# Patient Record
Sex: MALE | Race: WHITE
[De-identification: names, ages, dates, MRNs, and addresses within clinical notes are randomized per-mention and may not be internally consistent; named-entity substitution may affect disease eponyms.]

---

## 2017-06-11 ENCOUNTER — HOSPITAL ENCOUNTER (INPATIENT)
Dept: HOSPITAL 47 - EC | Age: 72
LOS: 1 days | Discharge: HOME | DRG: 607 | End: 2017-06-12
Payer: MEDICARE

## 2017-06-11 VITALS — RESPIRATION RATE: 18 BRPM

## 2017-06-11 VITALS — BODY MASS INDEX: 27.2 KG/M2

## 2017-06-11 DIAGNOSIS — I48.91: ICD-10-CM

## 2017-06-11 DIAGNOSIS — I25.2: ICD-10-CM

## 2017-06-11 DIAGNOSIS — I49.3: ICD-10-CM

## 2017-06-11 DIAGNOSIS — Z96.1: ICD-10-CM

## 2017-06-11 DIAGNOSIS — Z79.82: ICD-10-CM

## 2017-06-11 DIAGNOSIS — I25.119: ICD-10-CM

## 2017-06-11 DIAGNOSIS — Z98.42: ICD-10-CM

## 2017-06-11 DIAGNOSIS — K21.9: ICD-10-CM

## 2017-06-11 DIAGNOSIS — Z98.41: ICD-10-CM

## 2017-06-11 DIAGNOSIS — I11.0: ICD-10-CM

## 2017-06-11 DIAGNOSIS — K74.60: ICD-10-CM

## 2017-06-11 DIAGNOSIS — T78.40XA: ICD-10-CM

## 2017-06-11 DIAGNOSIS — Z79.899: ICD-10-CM

## 2017-06-11 DIAGNOSIS — M19.91: ICD-10-CM

## 2017-06-11 DIAGNOSIS — L53.9: Primary | ICD-10-CM

## 2017-06-11 DIAGNOSIS — K44.9: ICD-10-CM

## 2017-06-11 DIAGNOSIS — R21: ICD-10-CM

## 2017-06-11 DIAGNOSIS — Z87.11: ICD-10-CM

## 2017-06-11 DIAGNOSIS — I50.22: ICD-10-CM

## 2017-06-11 DIAGNOSIS — E11.9: ICD-10-CM

## 2017-06-11 DIAGNOSIS — Z87.891: ICD-10-CM

## 2017-06-11 DIAGNOSIS — I25.5: ICD-10-CM

## 2017-06-11 DIAGNOSIS — Z86.718: ICD-10-CM

## 2017-06-11 DIAGNOSIS — N40.0: ICD-10-CM

## 2017-06-11 DIAGNOSIS — E78.5: ICD-10-CM

## 2017-06-11 LAB
ALP SERPL-CCNC: 411 U/L (ref 38–126)
ALT SERPL-CCNC: 76 U/L (ref 21–72)
AMYLASE SERPL-CCNC: 61 U/L (ref 30–110)
ANION GAP SERPL CALC-SCNC: 5 MMOL/L
ANION GAP SERPL CALC-SCNC: 7 MMOL/L
APTT BLD: 24.7 SEC (ref 22–30)
AST SERPL-CCNC: 73 U/L (ref 17–59)
BUN SERPL-SCNC: 32 MG/DL (ref 9–20)
BUN SERPL-SCNC: 35 MG/DL (ref 9–20)
CALCIUM SPEC-MCNC: 8.8 MG/DL (ref 8.4–10.2)
CALCIUM SPEC-MCNC: 8.8 MG/DL (ref 8.4–10.2)
CELLS COUNTED: 200
CH: 35.4
CHCM: 33.5
CHLORIDE SERPL-SCNC: 111 MMOL/L (ref 98–107)
CHLORIDE SERPL-SCNC: 111 MMOL/L (ref 98–107)
CK SERPL-CCNC: 107 U/L (ref 55–170)
CK SERPL-CCNC: 132 U/L (ref 55–170)
CK SERPL-CCNC: 192 U/L (ref 55–170)
CO2 SERPL-SCNC: 21 MMOL/L (ref 22–30)
CO2 SERPL-SCNC: 24 MMOL/L (ref 22–30)
ERYTHROCYTE [DISTWIDTH] IN BLOOD BY AUTOMATED COUNT: 4.01 M/UL (ref 4.3–5.9)
ERYTHROCYTE [DISTWIDTH] IN BLOOD: 14.9 % (ref 11.5–15.5)
GLUCOSE SERPL-MCNC: 163 MG/DL (ref 74–99)
GLUCOSE SERPL-MCNC: 190 MG/DL (ref 74–99)
HCT VFR BLD AUTO: 42.5 % (ref 39–53)
HDW: 2.28
HGB BLD-MCNC: 14.8 GM/DL (ref 13–17.5)
INR PPP: 1.2 (ref ?–1.1)
MAGNESIUM SPEC-SCNC: 2.2 MG/DL (ref 1.6–2.3)
MCH RBC QN AUTO: 36.9 PG (ref 25–35)
MCHC RBC AUTO-ENTMCNC: 34.9 G/DL (ref 31–37)
MCV RBC AUTO: 106 FL (ref 80–100)
NON-AFRICAN AMERICAN GFR(MDRD): >60
NON-AFRICAN AMERICAN GFR(MDRD): >60
POTASSIUM SERPL-SCNC: 5.3 MMOL/L (ref 3.5–5.1)
POTASSIUM SERPL-SCNC: 5.5 MMOL/L (ref 3.5–5.1)
POTASSIUM SERPL-SCNC: 6 MMOL/L (ref 3.5–5.1)
PROT SERPL-MCNC: 7.2 G/DL (ref 6.3–8.2)
PT BLD: 11.8 SEC (ref 9–12)
SODIUM SERPL-SCNC: 139 MMOL/L (ref 137–145)
SODIUM SERPL-SCNC: 140 MMOL/L (ref 137–145)
TROPONIN I SERPL-MCNC: 0.02 NG/ML (ref 0–0.03)
TROPONIN I SERPL-MCNC: <0.012 NG/ML (ref 0–0.03)
TROPONIN I SERPL-MCNC: <0.012 NG/ML (ref 0–0.03)
WBC # BLD AUTO: 10.7 K/UL (ref 3.8–10.6)
WBC (PEROX): 11.03

## 2017-06-11 PROCEDURE — 96375 TX/PRO/DX INJ NEW DRUG ADDON: CPT

## 2017-06-11 PROCEDURE — 80053 COMPREHEN METABOLIC PANEL: CPT

## 2017-06-11 PROCEDURE — 80048 BASIC METABOLIC PNL TOTAL CA: CPT

## 2017-06-11 PROCEDURE — 82977 ASSAY OF GGT: CPT

## 2017-06-11 PROCEDURE — 82550 ASSAY OF CK (CPK): CPT

## 2017-06-11 PROCEDURE — 82553 CREATINE MB FRACTION: CPT

## 2017-06-11 PROCEDURE — 96374 THER/PROPH/DIAG INJ IV PUSH: CPT

## 2017-06-11 PROCEDURE — 83735 ASSAY OF MAGNESIUM: CPT

## 2017-06-11 PROCEDURE — 94760 N-INVAS EAR/PLS OXIMETRY 1: CPT

## 2017-06-11 PROCEDURE — 83036 HEMOGLOBIN GLYCOSYLATED A1C: CPT

## 2017-06-11 PROCEDURE — 83880 ASSAY OF NATRIURETIC PEPTIDE: CPT

## 2017-06-11 PROCEDURE — 85730 THROMBOPLASTIN TIME PARTIAL: CPT

## 2017-06-11 PROCEDURE — 87389 HIV-1 AG W/HIV-1&-2 AB AG IA: CPT

## 2017-06-11 PROCEDURE — 85610 PROTHROMBIN TIME: CPT

## 2017-06-11 PROCEDURE — 85025 COMPLETE CBC W/AUTO DIFF WBC: CPT

## 2017-06-11 PROCEDURE — 84132 ASSAY OF SERUM POTASSIUM: CPT

## 2017-06-11 PROCEDURE — 93005 ELECTROCARDIOGRAM TRACING: CPT

## 2017-06-11 PROCEDURE — 76705 ECHO EXAM OF ABDOMEN: CPT

## 2017-06-11 PROCEDURE — 83690 ASSAY OF LIPASE: CPT

## 2017-06-11 PROCEDURE — 82150 ASSAY OF AMYLASE: CPT

## 2017-06-11 PROCEDURE — 99285 EMERGENCY DEPT VISIT HI MDM: CPT

## 2017-06-11 PROCEDURE — 80074 ACUTE HEPATITIS PANEL: CPT

## 2017-06-11 PROCEDURE — 80061 LIPID PANEL: CPT

## 2017-06-11 PROCEDURE — 84484 ASSAY OF TROPONIN QUANT: CPT

## 2017-06-11 PROCEDURE — 71020: CPT

## 2017-06-11 PROCEDURE — 36415 COLL VENOUS BLD VENIPUNCTURE: CPT

## 2017-06-11 RX ADMIN — DIPHENHYDRAMINE HYDROCHLORIDE PRN MG: 50 INJECTION, SOLUTION INTRAMUSCULAR; INTRAVENOUS at 20:03

## 2017-06-11 RX ADMIN — PANTOPRAZOLE SODIUM SCH MG: 40 TABLET, DELAYED RELEASE ORAL at 17:51

## 2017-06-11 RX ADMIN — DIPHENHYDRAMINE HYDROCHLORIDE PRN MG: 50 INJECTION, SOLUTION INTRAMUSCULAR; INTRAVENOUS at 12:00

## 2017-06-11 NOTE — US
EXAMINATION TYPE: US abdomen limited

 

DATE OF EXAM: 6/11/2017

 

COMPARISON: CT abdomen and pelvis October 11, 2016

 

CLINICAL HISTORY: Pain. elevated liver enzymes, cholecystectomy

 

EXAM MEASUREMENTS:

 

Liver Length:  11.8 cm   

Gallbladder Wall:  Surgically absent  

CBD:  0.5 cm

Right Kidney:  10.7 x 4.6 x 4.4 cm

 

 

 

Pancreas:  Obscured by bowel gas

Liver:  limited evaluation, best visualized intercostally, appears course  

Gallbladder:  Surgically absent

**Evidence for sonographic Ring's sign:  no

CBD: visualized segment appears wnl 

Right Kidney:  no evidence of hydronephrosis  

 

Exam noted suboptimal by technologist due to body habitus and shadowing from overlying bowel gas. Vis
ualized liver is heterogeneous in appearance, no worrisome intrahepatic ductal dilatation seen. Findi
ngs correlate with CT with coarse lobulated architecture. Evaluation for focal masses is suboptimal d
ue to the heterogeneity.

 

IMPRESSION: Heterogeneity of liver when correlated with CT favors cirrhosis over diffuse fatty infilt
ration. Clinical and lab correlation advised.
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
## 2017-06-11 NOTE — CONS
DATE OF CONSULTATION:  



This is a 72-year-old gentleman with a known history of CAD, previous 

aortocoronary bypass surgery and about 18 months ago I performed 

cardiac catheterization, which revealed that his bypasses were patent 

and he was advised medical therapy. He also has issues with some liver 

dysfunction and has had some time to time ascites. This patient is 

status post previous cervical spine surgery as well. Cardiac cath from 

2015 in October revealed that he had a total occlusion of a 

nonfunctional right internal mammary artery graft LAD but the LIMA to 

LAD was widely patent and vein graft to the distal RCA/PDA was widely 

patent. Circumflex and ramus were free of significant disease. Large 

septal branch had some stenosis and ejection fraction is 45% with 

inferobasal hypokinesia and no significant MR. This hospitalization 

appears to be more or less with complaints of some rash all over the 

body and some itching and also had some discomfort in the chest, sharp 

in nature, lasting a few seconds and then he had some pain on the 

right lateral aspect of the chest and right jaw. The pain is very 

atypical, fleeting in nature, sharp in nature. Troponins are normal. 

He is resting comfortably without symptoms.  



PAST MEDICAL HISTORY: 

1. Remarkable for coronary artery disease, previous bypass surgery 

with patent grafts.  

2. History of diabetes mellitus. 

3. Hyperlipidemia. 

4. History of liver disease with ascites in the past. 



MEDICATIONS: Flomax 0.4 mg daily, Lipitor 40 mg daily, aspirin 81 mg 

daily, iron supplements, metoprolol 75 mg daily, omeprazole 20 mg 

daily.  



ALLERGIES: CODEINE. 



On examination, blood pressure is 128/70, pulse rate 70 per minute, 

regular.  

HEENT: Unremarkable. Fundus was not examined by me. Neck is supple. 

There is no significant JVD or carotid bruit. S1, S2 heard normally. 

Short systolic murmur noted. Lungs reveal diminished air entry.  

Abdomen is soft, distended. There is mild hepatomegaly. Lower 

extremities reveal diminished pulses. Central nervous system grossly 

within normal limits.  



EKG revealed sinus mechanism with nonspecific ST-T changes of a mild 

degree.  



IMPRESSION: 

1. Atypical chest pain. 

2. Diffuse erythematous rash all over the body of unclear etiology. 

3. History of coronary artery disease, stable, previous bypass surgery. 

4. Type 2 diabetes mellitus. 

5. History of liver disease. 

6. History of benign prostatic hypertrophy. 



RECOMMENDATIONS: I am recommending that from a cardiac standpoint, no 

intervention is necessary. I recommend that we continue current 

medical therapy without any specific intervention. His rash issues, 

which seems to be his most major concern, will be addressed by the 

admitting doctor. We will see this patient as needed from a cardiac 

standpoint. Thank you very much for the consult.

## 2017-06-12 VITALS — SYSTOLIC BLOOD PRESSURE: 132 MMHG | HEART RATE: 58 BPM | TEMPERATURE: 97.5 F | DIASTOLIC BLOOD PRESSURE: 69 MMHG

## 2017-06-12 LAB
ALP SERPL-CCNC: 230 U/L (ref 38–126)
ALT SERPL-CCNC: 64 U/L (ref 21–72)
ANION GAP SERPL CALC-SCNC: 7 MMOL/L
AST SERPL-CCNC: 44 U/L (ref 17–59)
BASOPHILS # BLD AUTO: 0 K/UL (ref 0–0.2)
BASOPHILS NFR BLD AUTO: 0 %
BUN SERPL-SCNC: 30 MG/DL (ref 9–20)
CALCIUM SPEC-MCNC: 9.6 MG/DL (ref 8.4–10.2)
CH: 35.4
CHCM: 33.8
CHLORIDE SERPL-SCNC: 109 MMOL/L (ref 98–107)
CHOLEST SERPL-MCNC: 361 MG/DL (ref ?–200)
CO2 SERPL-SCNC: 24 MMOL/L (ref 22–30)
EOSINOPHIL # BLD AUTO: 0 K/UL (ref 0–0.7)
EOSINOPHIL NFR BLD AUTO: 0 %
ERYTHROCYTE [DISTWIDTH] IN BLOOD BY AUTOMATED COUNT: 4.01 M/UL (ref 4.3–5.9)
ERYTHROCYTE [DISTWIDTH] IN BLOOD: 14.6 % (ref 11.5–15.5)
GGT SERPL-CCNC: 312 U/L (ref 15–73)
GLUCOSE SERPL-MCNC: 120 MG/DL (ref 74–99)
HCT VFR BLD AUTO: 42.3 % (ref 39–53)
HDLC SERPL-MCNC: 49 MG/DL (ref 40–60)
HDW: 2.2
HEMOGLOBIN A1C: 5.3 % (ref 4.2–6.1)
HEPATITIS B CORE IGM INDEX: 0.03
HEPATITIS B SURFACE AG INDEX: 0.05
HEPATITIS C VIRUS IGG  INDEX: 0.04
HGB BLD-MCNC: 14.2 GM/DL (ref 13–17.5)
LUC NFR BLD AUTO: 1 %
LYMPHOCYTES # SPEC AUTO: 0.6 K/UL (ref 1–4.8)
LYMPHOCYTES NFR SPEC AUTO: 3 %
MCH RBC QN AUTO: 35.3 PG (ref 25–35)
MCHC RBC AUTO-ENTMCNC: 33.5 G/DL (ref 31–37)
MCV RBC AUTO: 105.3 FL (ref 80–100)
MONOCYTES # BLD AUTO: 0.9 K/UL (ref 0–1)
MONOCYTES NFR BLD AUTO: 5 %
NEUTROPHILS # BLD AUTO: 15.7 K/UL (ref 1.3–7.7)
NEUTROPHILS NFR BLD AUTO: 90 %
NON-AFRICAN AMERICAN GFR(MDRD): >60
POTASSIUM SERPL-SCNC: 4.9 MMOL/L (ref 3.5–5.1)
PROT SERPL-MCNC: 6.8 G/DL (ref 6.3–8.2)
SODIUM SERPL-SCNC: 140 MMOL/L (ref 137–145)
TRIGL SERPL-MCNC: 94 MG/DL (ref ?–150)
WBC # BLD AUTO: 0.21 10*3/UL
WBC # BLD AUTO: 17.4 K/UL (ref 3.8–10.6)
WBC (PEROX): 17.65

## 2017-06-12 RX ADMIN — DIPHENHYDRAMINE HYDROCHLORIDE PRN MG: 50 INJECTION, SOLUTION INTRAMUSCULAR; INTRAVENOUS at 01:52

## 2017-06-12 RX ADMIN — DIPHENHYDRAMINE HYDROCHLORIDE PRN MG: 50 INJECTION, SOLUTION INTRAMUSCULAR; INTRAVENOUS at 08:40

## 2017-06-12 RX ADMIN — PANTOPRAZOLE SODIUM SCH MG: 40 TABLET, DELAYED RELEASE ORAL at 06:33

## 2017-06-12 NOTE — P.PN
Subjective


Principal diagnosis: 





Chest discomfort and rash


This is a 72-year-old  gentleman with known history of coronary artery 

disease and prior bypass surgery, diabetes, hyperlipidemia, history of liver 

disease with ascites, he presented to the hospital with complaints of a body 

rash, as well as some atypical chest discomfort.  Troponins were negative 3.  

EKG did not reveal any significant changes.  At pressure 132/60 with a heart 

rate in the 50s.  White blood cell count 17.4, hemoglobin 14.2, platelet count 

138.  Potassium 4.9, BUN 30, creatinine 0.9.








Objective





- Vital Signs


Vital signs: 


 Vital Signs











Temp  97.5 F L  06/12/17 12:00


 


Pulse  58 L  06/12/17 12:00


 


Resp  18   06/12/17 12:00


 


BP  132/69   06/12/17 12:00


 


Pulse Ox  96   06/12/17 12:00








 Intake & Output











 06/11/17 06/12/17 06/12/17





 18:59 06:59 18:59


 


Intake Total 480 200 180


 


Output Total 300 300 


 


Balance 180 -100 180


 


Weight  67.6 kg 


 


Intake:   


 


  Oral 480 200 180


 


Output:   


 


  Urine 300 300 


 


Other:   


 


  Voiding Method Toilet Toilet 





 Urinal Urinal 


 


  # Voids 1 1 


 


  # Bowel Movements   0














- Exam





PHYSICAL EXAMINATION: 





HEENT: Head is atraumatic, normocephalic.  Pupils equal, round.  Neck is 

supple.  There is no elevated jugular venous pressure.





HEART EXAMINATION: Heart S1, S2 normal.  No murmur or gallop heard.





CHEST EXAMINATION: Lungs are clear to auscultation and precussion. No chest 

wall tenderness is noted on palpation or with deep breathing.]





ABDOMEN: [ Soft, nontender. Bowel sounds are heard. No organomegaly noted].


 


EXTREMITIES:[ 2+ peripheral pulses with no evidence of peripheral edema and no 

calf tenderness noted].





NEUROLOGIC [patient is awake, alert and oriented -3.]


 


.


 








- Labs


CBC & Chem 7: 


 06/12/17 06:16





 06/12/17 06:16


Labs: 


 Abnormal Lab Results - Last 24 Hours (Table)











  06/12/17 06/12/17 Range/Units





  06:16 06:16 


 


WBC   17.4 H  (3.8-10.6)  k/uL


 


RBC   4.01 L  (4.30-5.90)  m/uL


 


MCV   105.3 H  (80.0-100.0)  fL


 


MCH   35.3 H  (25.0-35.0)  pg


 


Plt Count   138 L  (150-450)  k/uL


 


Neutrophils #   15.7 H  (1.3-7.7)  k/uL


 


Lymphocytes #   0.6 L  (1.0-4.8)  k/uL


 


Chloride  109 H   ()  mmol/L


 


BUN  30 H   (9-20)  mg/dL


 


Glucose  120 H   (74-99)  mg/dL


 


Total Bilirubin  3.7 H   (0.2-1.3)  mg/dL


 


GGT  312 H   (15-73)  U/L


 


Alkaline Phosphatase  230 H   ()  U/L


 


Albumin  2.9 L   (3.5-5.0)  g/dL


 


Cholesterol  361 H   (<200)  mg/dL


 


LDL Cholesterol, Calc  293 H   (0-99)  mg/dL














Assessment and Plan


(1) Atypical angina


Status: Acute   





(2) Hyperlipemia


Status: Acute   





(3) Hx of CABG


Status: Acute   





(4) CAD (coronary artery disease)


Status: Acute   





(5) Diabetes mellitus


Status: Acute   





(6) Hx of CABG


Status: Acute   





(7) Liver cirrhosis


Status: Acute   


Plan: 


From cardiology's perspective, patient may be able to be discharged home once 

cleared by the primary.  We will follow him now with you on an as-needed basis 

only, please don't hesitate to call with any questions.








DNP note has been reviewed, I agree with a documented findings and plan of 

care.  Patient was seen and examined.

## 2017-06-13 LAB
HIV-1/HIV-2 AB SCREEN: (no result)
HIV1D: (no result)
HIV2D: (no result)

## 2017-09-06 ENCOUNTER — HOSPITAL ENCOUNTER (OUTPATIENT)
Dept: HOSPITAL 47 - EC | Age: 72
Setting detail: OBSERVATION
LOS: 1 days | Discharge: HOME | End: 2017-09-07
Payer: MEDICARE

## 2017-09-06 VITALS — BODY MASS INDEX: 27.3 KG/M2

## 2017-09-06 VITALS — RESPIRATION RATE: 18 BRPM

## 2017-09-06 DIAGNOSIS — M19.90: ICD-10-CM

## 2017-09-06 DIAGNOSIS — I25.2: ICD-10-CM

## 2017-09-06 DIAGNOSIS — R01.1: ICD-10-CM

## 2017-09-06 DIAGNOSIS — Z79.82: ICD-10-CM

## 2017-09-06 DIAGNOSIS — Z87.11: ICD-10-CM

## 2017-09-06 DIAGNOSIS — E78.5: ICD-10-CM

## 2017-09-06 DIAGNOSIS — Z79.899: ICD-10-CM

## 2017-09-06 DIAGNOSIS — K74.60: ICD-10-CM

## 2017-09-06 DIAGNOSIS — I11.0: ICD-10-CM

## 2017-09-06 DIAGNOSIS — Z88.3: ICD-10-CM

## 2017-09-06 DIAGNOSIS — Z87.891: ICD-10-CM

## 2017-09-06 DIAGNOSIS — Z88.7: ICD-10-CM

## 2017-09-06 DIAGNOSIS — R07.89: Primary | ICD-10-CM

## 2017-09-06 DIAGNOSIS — Z95.1: ICD-10-CM

## 2017-09-06 DIAGNOSIS — Z88.5: ICD-10-CM

## 2017-09-06 DIAGNOSIS — I48.91: ICD-10-CM

## 2017-09-06 DIAGNOSIS — Z86.718: ICD-10-CM

## 2017-09-06 DIAGNOSIS — K44.9: ICD-10-CM

## 2017-09-06 DIAGNOSIS — E11.9: ICD-10-CM

## 2017-09-06 DIAGNOSIS — I50.22: ICD-10-CM

## 2017-09-06 DIAGNOSIS — K21.9: ICD-10-CM

## 2017-09-06 DIAGNOSIS — R79.1: ICD-10-CM

## 2017-09-06 DIAGNOSIS — D64.9: ICD-10-CM

## 2017-09-06 DIAGNOSIS — I25.10: ICD-10-CM

## 2017-09-06 DIAGNOSIS — R06.02: ICD-10-CM

## 2017-09-06 DIAGNOSIS — M79.89: ICD-10-CM

## 2017-09-06 LAB
ALP SERPL-CCNC: 360 U/L (ref 38–126)
ALT SERPL-CCNC: 32 U/L (ref 21–72)
ANION GAP SERPL CALC-SCNC: 9 MMOL/L
APTT BLD: 31.2 SEC (ref 22–30)
AST SERPL-CCNC: 57 U/L (ref 17–59)
BUN SERPL-SCNC: 18 MG/DL (ref 9–20)
CALCIUM SPEC-MCNC: 9.2 MG/DL (ref 8.4–10.2)
CELLS COUNTED: 100
CH: 34
CHCM: 32.2
CHLORIDE SERPL-SCNC: 111 MMOL/L (ref 98–107)
CK SERPL-CCNC: 134 U/L (ref 55–170)
CK SERPL-CCNC: 190 U/L (ref 55–170)
CO2 SERPL-SCNC: 21 MMOL/L (ref 22–30)
ERYTHROCYTE [DISTWIDTH] IN BLOOD BY AUTOMATED COUNT: 3.43 M/UL (ref 4.3–5.9)
ERYTHROCYTE [DISTWIDTH] IN BLOOD: 15.2 % (ref 11.5–15.5)
GLUCOSE SERPL-MCNC: 86 MG/DL (ref 74–99)
HCT VFR BLD AUTO: 36.5 % (ref 39–53)
HDW: 2.45
HGB BLD-MCNC: 12.1 GM/DL (ref 13–17.5)
INR PPP: 1.3 (ref ?–1.2)
MAGNESIUM SPEC-SCNC: 1.9 MG/DL (ref 1.6–2.3)
MCH RBC QN AUTO: 35.2 PG (ref 25–35)
MCHC RBC AUTO-ENTMCNC: 33.1 G/DL (ref 31–37)
MCV RBC AUTO: 106.3 FL (ref 80–100)
NON-AFRICAN AMERICAN GFR(MDRD): 59
POTASSIUM SERPL-SCNC: 4.4 MMOL/L (ref 3.5–5.1)
PROT SERPL-MCNC: 8.6 G/DL (ref 6.3–8.2)
PT BLD: 12.9 SEC (ref 9–12)
SODIUM SERPL-SCNC: 141 MMOL/L (ref 137–145)
TROPONIN I SERPL-MCNC: 0.01 NG/ML (ref 0–0.03)
TROPONIN I SERPL-MCNC: 0.02 NG/ML (ref 0–0.03)
WBC # BLD AUTO: 5.9 K/UL (ref 3.8–10.6)
WBC (PEROX): 5.83

## 2017-09-06 PROCEDURE — 84484 ASSAY OF TROPONIN QUANT: CPT

## 2017-09-06 PROCEDURE — 93970 EXTREMITY STUDY: CPT

## 2017-09-06 PROCEDURE — 96366 THER/PROPH/DIAG IV INF ADDON: CPT

## 2017-09-06 PROCEDURE — 82553 CREATINE MB FRACTION: CPT

## 2017-09-06 PROCEDURE — 96365 THER/PROPH/DIAG IV INF INIT: CPT

## 2017-09-06 PROCEDURE — 85730 THROMBOPLASTIN TIME PARTIAL: CPT

## 2017-09-06 PROCEDURE — 93306 TTE W/DOPPLER COMPLETE: CPT

## 2017-09-06 PROCEDURE — 93017 CV STRESS TEST TRACING ONLY: CPT

## 2017-09-06 PROCEDURE — 85025 COMPLETE CBC W/AUTO DIFF WBC: CPT

## 2017-09-06 PROCEDURE — 99285 EMERGENCY DEPT VISIT HI MDM: CPT

## 2017-09-06 PROCEDURE — 82550 ASSAY OF CK (CPK): CPT

## 2017-09-06 PROCEDURE — 83880 ASSAY OF NATRIURETIC PEPTIDE: CPT

## 2017-09-06 PROCEDURE — 71020: CPT

## 2017-09-06 PROCEDURE — 80053 COMPREHEN METABOLIC PANEL: CPT

## 2017-09-06 PROCEDURE — 85610 PROTHROMBIN TIME: CPT

## 2017-09-06 PROCEDURE — 83690 ASSAY OF LIPASE: CPT

## 2017-09-06 PROCEDURE — 71275 CT ANGIOGRAPHY CHEST: CPT

## 2017-09-06 PROCEDURE — 70491 CT SOFT TISSUE NECK W/DYE: CPT

## 2017-09-06 PROCEDURE — 78452 HT MUSCLE IMAGE SPECT MULT: CPT

## 2017-09-06 PROCEDURE — 36415 COLL VENOUS BLD VENIPUNCTURE: CPT

## 2017-09-06 PROCEDURE — 83735 ASSAY OF MAGNESIUM: CPT

## 2017-09-06 PROCEDURE — 85379 FIBRIN DEGRADATION QUANT: CPT

## 2017-09-06 PROCEDURE — 93005 ELECTROCARDIOGRAM TRACING: CPT

## 2017-09-06 PROCEDURE — 94760 N-INVAS EAR/PLS OXIMETRY 1: CPT

## 2017-09-06 NOTE — CT
EXAMINATION TYPE: CT angio chest

 

DATE OF EXAM: 9/6/2017

 

COMPARISON: 6/21/2015

 

HISTORY: Chest pressure and shortness of breath with shooting pain into the neck.

 

CT DLP: 441.7 mGycm

 

CONTRAST: 

CT chest with contrast and 3D reconstruction with MIP imaging is performed with IV Contrast, patient 
injected with 100 mL of Omnipaque 350.

 

Contrast-enhanced CT of the chest was performed through the course of the pulmonary arteries with bob
g and mediastinal window settings submitted.  3D reconstruction with MIP imaging was also performed.

 

PULMONARY ARTERIES:  The pulmonary arteries and their major tributaries are patent.  I do not see talita
dence for sizable filling defect to suggest pulmonary embolic process. 

 

LUNGS: The lungs are clear and free of infiltrate. No evidence for atelectasis.   No pulmonary nodule
 or mass is detected.  No pleural effusion.  

 

MEDIASTINUM:  Thoracic aorta is of normal caliber . The heart is mildly enlarged. Changes of median s
ternotomy and CABG. No evidence for mediastinal mass. Multiple mediastinal lymph nodes without lymph 
nodes greater than 1 cm.

 

HILAR STRUCTURES: No evidence for mass.  No hilar lymph nodes greater than 1 cm.

 

UPPER ABDOMEN:  No significant abnormality is seen.

 

IMPRESSION:

1.  No evidence for Pulmonary embolism at this time.

## 2017-09-06 NOTE — XR
EXAMINATION TYPE: XR chest 2V

 

DATE OF EXAM: 9/6/2017

 

COMPARISON: Prior chest x-ray 6/11/2017

 

HISTORY: Chest pain

 

TECHNIQUE:  Frontal and lateral views of the chest are obtained.

 

FINDINGS:  Patient is post sternotomy. There are overlying cardiac leads. Postop change noted in the 
cervical spine. Heart is enlarged and stable. No airspace disease, pneumothorax, or pleural effusion.
 There is eventration of right hemidiaphragm.

 

IMPRESSION:  No acute cardiopulmonary process.

## 2017-09-06 NOTE — US
EXAMINATION TYPE: US venous doppler duplex LE BI

 

DATE OF EXAM: 9/6/2017 7:58 PM

 

COMPARISON: NONE

 

CLINICAL HISTORY: r/o DVT. Bilateral leg pain

 

SIDE PERFORMED: Bilateral  

 

TECHNIQUE:  The lower extremity deep venous system is examined utilizing real time linear array sonog
gt with graded compression, doppler sonography and color-flow sonography.

 

VESSELS IMAGED:

External Iliac Vein (EIV)

Common Femoral Vein

Deep Femoral Vein

Greater Saphenous Vein *

Femoral Vein

Popliteal Vein

Small Saphenous Vein *

Proximal Calf Veins

(* superficial vessels)

 

 Grayscale, color doppler, spectral doppler imaging performed of the deep veins of the lower extremit
ies.  There is normal flow, compressibility, vascular waveforms.

 

Right Leg:  Negative for DVT

 

Left Leg:  Negative for DVT

 

 

 

IMPRESSION: No evidence of DVT bilateral legs
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
## 2017-09-06 NOTE — CT
EXAMINATION TYPE: CT soft tissue neck w con

 

DATE OF EXAM: 9/6/2017

 

COMPARISON: NONE

 

HISTORY: Chest pressure and shortness of breath with shooting pain into the neck.

 

CT DLP: 477.7 mGycm

 

CONTRAST: 

CT scan of the neck is performed with IV Contrast, patient injected with 100 mL of Omnipaque 350.

 

Contrast enhanced CT of the neck was performed from the skull base through the lung apices.

 

AIRWAY:   The supraglottic, glottic, and subglottic portions of the airway appear patent and free of 
mass.

 

SALIVARY GLANDS:  The submandibular and parotid glands are free of mass or inflammatory process.

 

THYROID GLAND:  No nodules or masses seen.

 

LYMPH NODES: No adenopathy seen greater than 1cm.

 

LUNG APICES:  No nodule or mass is seen.

 

OTHER: Calcific plaque internal carotid arteries and carotid bulbs. Postoperative changes cervical sp
ine. Hypertrophic changes identified. No significant degenerative change of the cervical spine.  No a
bscess seen.

 

IMPRESSION:

 

 

1. No significant abnormality of the neck.

2. Degenerative changes cervical spine with the changes of ACDF.

## 2017-09-07 VITALS — TEMPERATURE: 97.4 F | DIASTOLIC BLOOD PRESSURE: 68 MMHG | HEART RATE: 73 BPM | SYSTOLIC BLOOD PRESSURE: 145 MMHG

## 2017-09-07 LAB
ALP SERPL-CCNC: 316 U/L (ref 38–126)
ALT SERPL-CCNC: 31 U/L (ref 21–72)
ANION GAP SERPL CALC-SCNC: 9 MMOL/L
AST SERPL-CCNC: 57 U/L (ref 17–59)
BUN SERPL-SCNC: 18 MG/DL (ref 9–20)
CALCIUM SPEC-MCNC: 9.2 MG/DL (ref 8.4–10.2)
CELLS COUNTED: 100
CH: 35.1
CHCM: 32.9
CHLORIDE SERPL-SCNC: 112 MMOL/L (ref 98–107)
CK SERPL-CCNC: 118 U/L (ref 55–170)
CO2 SERPL-SCNC: 19 MMOL/L (ref 22–30)
ERYTHROCYTE [DISTWIDTH] IN BLOOD BY AUTOMATED COUNT: 3.17 M/UL (ref 4.3–5.9)
ERYTHROCYTE [DISTWIDTH] IN BLOOD: 15.8 % (ref 11.5–15.5)
GLUCOSE BLD-MCNC: 101 MG/DL (ref 75–99)
GLUCOSE BLD-MCNC: 231 MG/DL (ref 75–99)
GLUCOSE SERPL-MCNC: 102 MG/DL (ref 74–99)
HCT VFR BLD AUTO: 34.1 % (ref 39–53)
HDW: 2.51
HGB BLD-MCNC: 11.3 GM/DL (ref 13–17.5)
MCH RBC QN AUTO: 35.7 PG (ref 25–35)
MCHC RBC AUTO-ENTMCNC: 33.1 G/DL (ref 31–37)
MCV RBC AUTO: 107.6 FL (ref 80–100)
NON-AFRICAN AMERICAN GFR(MDRD): >60
POTASSIUM SERPL-SCNC: 4.3 MMOL/L (ref 3.5–5.1)
PROT SERPL-MCNC: 7.7 G/DL (ref 6.3–8.2)
SODIUM SERPL-SCNC: 140 MMOL/L (ref 137–145)
TROPONIN I SERPL-MCNC: 0.01 NG/ML (ref 0–0.03)
WBC # BLD AUTO: 5.8 K/UL (ref 3.8–10.6)
WBC (PEROX): 5.85

## 2017-09-07 NOTE — P.PN
Subjective





This is a 72-year-old male with known history of CAD, previous inferior MI and 

bypass surgery 1991 with LIMA to LAD, SRUTHI to RCA, vein graft to PLV branch 

dominant RCA.  He underwent cardiac catheter presentation October 2050 with Dr. MARY Acosta which revealed that the right internal mammary artery was not 

functional but the vein graft to the PLV branch was widely patent and the LIMA 

to LAD was patent.  The septal branch disease, ejection fraction at that time 

was 45% with normal inferior MI and ejection fraction was stable.  He presented 

to the emergency department with complaints of chest discomfort after going up 

and down the stairs.  Her maintained asymptomatic since his hospital admission.

  He underwent a Lexiscan stress test this morning which was normal.





Objective





- Vital Signs


Vital signs: 


 Vital Signs











Temp  97.4 F L  09/07/17 11:22


 


Pulse  73   09/07/17 11:22


 


Resp  18   09/07/17 11:22


 


BP  145/68   09/07/17 11:22


 


Pulse Ox  96   09/07/17 11:51








 Intake & Output











 09/06/17 09/07/17 09/07/17





 18:59 06:59 18:59


 


Intake Total  1000 


 


Balance  1000 


 


Weight 65.771 kg  


 


Intake:   


 


  Intake, IV Titration  600 





  Amount   


 


    Heparin Sodium,Porcine/  300 





    D5w Pmx 25,000 unit In   





    Dextrose/Water 1 500ml.   





    bag @ 1,000 UNIT/HR 20   





    mls/hr IV .Q24H [facility] Rx#:   





    [id number]   


 


    Sodium Chloride 0.9% 1,  300 





    000 ml @ 20 mls/hr IV .   





    Q24H [facility] Rx#:[id number]   


 


  Oral  400 


 


Other:   


 


  Voiding Method  Toilet 


 


  # Voids  2 














- Exam





GENERAL: Well-appearing, well-nourished and in no acute distress.


NECK: Supple without JVD or thyromegaly.


LUNGS: Breath sounds clear to auscultation bilaterally. Respiration equal and 

unlabored.  No wheezes, rales or rhonchi.


HEART: Regular rate and rhythm with short systolic murmur at the base, no rubs 

or gallops. S1 and S2 heard.


EXTREMITIES: Normal range of motion, no edema.  No clubbing or cyanosis. 

Peripheral pulses intact and strong.





- Labs


CBC & Chem 7: 


 09/07/17 03:42





 09/07/17 03:42


Labs: 


 Abnormal Lab Results - Last 24 Hours (Table)











  09/06/17 09/06/17 09/06/17 Range/Units





  15:50 15:50 15:50 


 


RBC  3.43 L    (4.30-5.90)  m/uL


 


Hgb  12.1 L    (13.0-17.5)  gm/dL


 


Hct  36.5 L    (39.0-53.0)  %


 


MCV  106.3 H    (80.0-100.0)  fL


 


MCH  35.2 H    (25.0-35.0)  pg


 


RDW     (11.5-15.5)  %


 


Monocytes # (Manual)     (0-1.0)  k/uL


 


PT    12.9 H  (9.0-12.0)  sec


 


INR    1.3 H  (<1.2)  


 


APTT    31.2 H  (22.0-30.0)  sec


 


D-Dimer    3.19 H  (<0.60)  mg/L FEU


 


Chloride   111 H   ()  mmol/L


 


Carbon Dioxide   21 L   (22-30)  mmol/L


 


Glucose     (74-99)  mg/dL


 


POC Glucose (mg/dL)     (75-99)  mg/dL


 


Total Bilirubin   3.9 H   (0.2-1.3)  mg/dL


 


Alkaline Phosphatase   360 H   ()  U/L


 


Total Creatine Kinase     ()  U/L


 


Total Protein   8.6 H   (6.3-8.2)  g/dL


 


Albumin   3.2 L   (3.5-5.0)  g/dL














  09/06/17 09/07/17 09/07/17 Range/Units





  21:42 03:42 03:42 


 


RBC   3.17 L   (4.30-5.90)  m/uL


 


Hgb   11.3 L   (13.0-17.5)  gm/dL


 


Hct   34.1 L   (39.0-53.0)  %


 


MCV   107.6 H   (80.0-100.0)  fL


 


MCH   35.7 H   (25.0-35.0)  pg


 


RDW   15.8 H   (11.5-15.5)  %


 


Monocytes # (Manual)   1.33 H   (0-1.0)  k/uL


 


PT     (9.0-12.0)  sec


 


INR     (<1.2)  


 


APTT     (22.0-30.0)  sec


 


D-Dimer     (<0.60)  mg/L FEU


 


Chloride    112 H  ()  mmol/L


 


Carbon Dioxide    19 L  (22-30)  mmol/L


 


Glucose    102 H  (74-99)  mg/dL


 


POC Glucose (mg/dL)     (75-99)  mg/dL


 


Total Bilirubin    3.5 H  (0.2-1.3)  mg/dL


 


Alkaline Phosphatase    316 H  ()  U/L


 


Total Creatine Kinase  190 H    ()  U/L


 


Total Protein     (6.3-8.2)  g/dL


 


Albumin    2.8 L  (3.5-5.0)  g/dL














  09/07/17 09/07/17 09/07/17 Range/Units





  03:42 06:43 12:04 


 


RBC     (4.30-5.90)  m/uL


 


Hgb     (13.0-17.5)  gm/dL


 


Hct     (39.0-53.0)  %


 


MCV     (80.0-100.0)  fL


 


MCH     (25.0-35.0)  pg


 


RDW     (11.5-15.5)  %


 


Monocytes # (Manual)     (0-1.0)  k/uL


 


PT     (9.0-12.0)  sec


 


INR     (<1.2)  


 


APTT  72.3 H    (22.0-30.0)  sec


 


D-Dimer     (<0.60)  mg/L FEU


 


Chloride     ()  mmol/L


 


Carbon Dioxide     (22-30)  mmol/L


 


Glucose     (74-99)  mg/dL


 


POC Glucose (mg/dL)   101 H  231 H  (75-99)  mg/dL


 


Total Bilirubin     (0.2-1.3)  mg/dL


 


Alkaline Phosphatase     ()  U/L


 


Total Creatine Kinase     ()  U/L


 


Total Protein     (6.3-8.2)  g/dL


 


Albumin     (3.5-5.0)  g/dL














Assessment and Plan


Plan: 





ASSESSMENT


1.  Chest pain, atypical 


2.  Abnormal d-dimer with a normal CT angiography negative for pulmonary 

embolism


3.  Hyperlipidemia





PLAN


Patient should resume his medications as previously prescribed.  We have added 

a statin.  His Lexiscan this morning was negative for any stress-induced 

reversible ischemia.  He is stable for discharge home from a cardiac 

standpoint.  He can follow-up to MARY Acosta in the office in 2 weeks.





Nurse Practitioner note has been reviewed, I agree with a documented findings 

and plan of care.  Patient was seen and examined.
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
## 2017-09-07 NOTE — EST
EXERCISE STRESS



AGE::

72



SEX::

M



HT::

61"



WT::

145



PROTOCOL::

LEXISCAN CARDIOLITE STRESS TEST



STAGE::

-



DURATION OF EXERCISE::

-



HEART RATE REST::

52



BLOOD PRESSURE REST::

137/72



MAXIMUM HEART RATE ACHIEVED::

68



MAXIMUM BLOOD PRESSURE::

136/70



85% MPHR::

125



100% MPHR::

147



METS::

-



INDICATIONS::

Chest pain.



CLINICAL INFORMATION::

Baseline EKG revealed a normal sinus rhythm without significant ST-T changes.  With

Lexiscan administration, heart rate changes from 52 to 68 beat per minute.  Blood

pressure changed from 137/72 to 136/70.  EKG was unremarkable. Rare PVC's were noted.

By EKG criteria, this is an unremarkable Lexiscan Stress test.  The nuclear scan

results, which are more pertinent will be reported by the radiologist.





CHECO / GABBIE: [id number] / Job#: [id number]

## 2017-09-07 NOTE — NM
EXAMINATION TYPE: NM stress lexiscan cardiolite

 

DATE OF EXAM: 9/7/2017

 

COMPARISON: NONE

 

HISTORY: Precordial chest pain and abnormal EKG.

 

TECHNIQUE:  After the intravenous administration of 11.5 mCi Tc 99m Sestamibi - Cardiolite resting SP
ECT images acquired 55 minutes post injection. 

 

The patient received 0.4mg Lexiscan, 27 mCi Tc 99m Sestamibi - Stress images obtained 30 minutes post
 injection 

 

FINDINGS:  

 

Review of stress and rest SPECT images demonstrates fixed perfusion abnormality involving the inferol
ateral wall felt to reflect remote insult. No definite evidence for stress-induced ischemia at this t
belén. Gated analysis shows normal wall motion with an estimated left ventricular ejection fraction of 
55% %.

 

 

 

IMPRESSION:  

 

No scintigraphic evidence for reversible ischemia. Fixed perfusion abnormality inferior lateral wall.

## 2017-09-07 NOTE — CONS
CONSULTATION



This is a 72-year-old gentleman with a known history of CAD, previous inferior MI and

bypass surgery in 1991 with a LIMA to LAD, SRUTHI to RCA, vein graft to the PLV branch of

dominant RCA.  He underwent a cardiac cath performed by me in October of 2015 which

revealed that the right internal mammary artery was nonfunctional but the vein graft to

the PLV branch was widely patent and the LIMA to LAD was patent.  There was a septal

branch that had disease, ejection fraction was 45% with a known old inferior MI and

ejection fraction was actually stable.  He was advised medical therapy and was doing

well.  He recently got , moved into his wife's house and was going up and down

the stairs of about 10 steps or so and developed chest discomfort.  Initially, it was

anterior chest discomfort and then he had an anterior neck discomfort, felt anxious,

concerned.  Came into the hospital.  His pain was gone.  His troponin was normal.  EKG

did not reveal any acute changes other than sinus bradycardia and old inferior MI but

his D-dimer was elevated.  He went on to have a CT angiography which did not reveal any

evidence of pulmonary embolism.  He is resting comfortably without symptoms.



PAST MEDICAL HISTORY:

1. CAD with previous inferior MI and bypass surgery in 1997 with a SOUTH to LAD, right

    internal mammary artery graft to the RCA and a vein graft to the distal RCA/PLV

    branch.

2. Hyperlipidemia.

3. History of some liver issues with hypoalbuminemia.

4. History of GI bleeding in the past.

5. Unremarkable cardiac cath with an occluded nonfunctional SRUTHI, which was performed

    in October 2015.



HOME MEDICATIONS:

Include metoprolol, aspirin, he has stopped taking statins.  He also takes omeprazole.



ALLERGIES:

He is allergic to MACROBID and CODEINE.



PHYSICAL EXAMINATION:

On examination, blood pressure was 128/60, pulse rate is about 55 per minute.

HEENT:  Unremarkable, fundus was not examined by me.

NECK:  Supple.  There is no JVD not elevated.  I do not hear a carotid bruit.  Heart

exam reveals S1, S2 with a short systolic murmur at the base.  Preserved second heart

sound.  No significant other murmurs, no gallops.  Lungs are clear.  Abdomen is soft,

nontender.  Lower extremities reveals diminished but palpable distal pulses.

CENTRAL NERVOUS SYSTEM:  Grossly within normal limits.



EKG revealed sinus bradycardia, old inferior MI.  No acute changes.



LABORATORY DATA:

Revealed elevated D-dimer, normal troponins.



IMPRESSION:

1. Chest pain syndrome seems atypical, but cannot rule out progression of coronary

    artery disease.

2. Abnormal D-dimer with a normal CT angiography for pulmonary embolism.

3. Hyperlipidemia for which he is not on medications by his choice.



RECOMMENDATION:

I am recommending that we resume his medications, add a statin agent, perform serial

troponins and based on findings, further recommendations.  I may consider performing a

Lexiscan stress test tomorrow morning.  Discussed my thoughts in detail with the

patient.



Thank you very much for the consult.





MMODL / IJN: [id number] / Job#: [id number]

## 2017-09-07 NOTE — P.DS
Providers


Date of admission: 


09/06/17 18:14





Expected date of discharge: 09/07/17


Attending physician: 


Jay Nunez





Consults: 





 





09/06/17 18:16


Consult Physician Urgent 


   Consulting Provider: David Piedra


   Consult Reason/Comments: Chest Pain


   Do you want consulting provider notified?: Yes, Notify in am











Primary care physician: 


Jay Nunze





[facility]: 


This is 72-year-old  male who presented to the emergency room on 09/06/ 2017 with a chief complaint of chest pain.  The patient states his pain began a 

few days ago and he described it as a dull pressure over the right side of his 

anterior chest.  He also states the pain was on the right side of his neck and 

was going up the right side of his head as well.  The patient also states he 

has been coughing over the past few days.





The patient has a history of coronary artery disease and underwent a CABG in 

1999.  He also had a recent stress test performed but results are not available 

at this time.





In the emergency room for bilateral lower extremity venous Doppler was 

performed which was negative for DVT.  A computed tomography scan of the neck 

was completed with contrast which showed degenerative changes of the cervical 

spine but otherwise was unremarkable.  CT of the chest was performed which was 

negative for pulmonary embolism.  His troponin was within normal limits.  CK-MB 

was within normal limits.  A BNP was completed and was 818.





The patient had minimal complaints this morning.  He states his chest pain has 

resolved and he does not have any shortness of breath.  He still does complain 

of a cough.  His laboratory data from this morning was reviewed.  His vital 

signs have been stable.





The patient was admitted to the observation unit for evaluation.  Cardiology 

was consulted.  The patient underwent a  stress Lexiscan today which was 

negative.  Echo was performed which showed an EF 50-55%.  The patient was 

placed on a statin and cleared for discharge from a cardiology standpoint.





Per Dr. Nunez the patient is stable for discharge and can follow up on an 

outpatient basis














Discharge diagnosis:





Chest pain, present on admission, resolved. lexiscan was negative for stress-

induced reversible ischemia.





Elevated d-dimer, computed tomography scan negative for PE





History of coronary artery disease with previous CABG





Chronic systolic heart failure





History of diabetes mellitus, type II











The above impression and plan of care have been discussed and directed by 

signing physician. Cece Herrera, nurse practitioner, acting as scribe for 

signing physician.














Patient Condition at Discharge: Stable





Plan - Discharge Summary


New Discharge Prescriptions: 


New


   Atorvastatin [Lipitor] 40 mg PO DAILY #30 tab





Continue


   Omeprazole [PriLOSEC] 20 mg PO AC-BID #120 cap


   Aspirin EC [Ecotrin Low Dose] 81 mg PO DAILY


   Metoprolol Tartrate [Lopressor] 75 mg PO DAILY


   Ibuprofen/Diphenhydramine HCl [Advil Pm Liqui-Gels] 1 cap PO HS


   Potassium Chloride [Klor-Con 10] 10 meq PO DAILY


Discharge Medication List





Omeprazole [PriLOSEC] 20 mg PO AC-BID #120 cap 11/18/15 [Rx]


Aspirin EC [Ecotrin Low Dose] 81 mg PO DAILY 12/22/15 [History]


Metoprolol Tartrate [Lopressor] 75 mg PO DAILY 10/16/16 [History]


Ibuprofen/Diphenhydramine HCl [Advil Pm Liqui-Gels] 1 cap PO HS 06/11/17 [

History]


Potassium Chloride [Klor-Con 10] 10 meq PO DAILY 06/11/17 [History]


Atorvastatin [Lipitor] 40 mg PO DAILY #30 tab 09/07/17 [Rx]








Follow up Appointment(s)/Referral(s): 


Jaimee Acosta MD [STAFF PHYSICIAN] - 2 Weeks


Jay Nunez DO [Primary Care Provider] - 1-2 days


Discharge Disposition: HOME SELF-CARE

## 2017-10-19 ENCOUNTER — HOSPITAL ENCOUNTER (OUTPATIENT)
Dept: HOSPITAL 47 - LABWHC1 | Age: 72
Discharge: HOME | End: 2017-10-19
Payer: MEDICARE

## 2017-10-19 DIAGNOSIS — R79.89: Primary | ICD-10-CM

## 2017-10-19 LAB
ALP SERPL-CCNC: 345 U/L (ref 38–126)
ALT SERPL-CCNC: 28 U/L (ref 21–72)
ANA W/REFLEX TO TITER: NEGATIVE
ANION GAP SERPL CALC-SCNC: 7 MMOL/L
AST SERPL-CCNC: 54 U/L (ref 17–59)
BASOPHILS # BLD AUTO: 0 K/UL (ref 0–0.2)
BASOPHILS NFR BLD AUTO: 0 %
BUN SERPL-SCNC: 15 MG/DL (ref 9–20)
CALCIUM SPEC-MCNC: 8.5 MG/DL (ref 8.4–10.2)
CH: 33.4
CHCM: 31.2
CHLORIDE SERPL-SCNC: 107 MMOL/L (ref 98–107)
CO2 SERPL-SCNC: 24 MMOL/L (ref 22–30)
EOSINOPHIL # BLD AUTO: 0.2 K/UL (ref 0–0.7)
EOSINOPHIL NFR BLD AUTO: 3 %
ERYTHROCYTE [DISTWIDTH] IN BLOOD BY AUTOMATED COUNT: 3.76 M/UL (ref 4.3–5.9)
ERYTHROCYTE [DISTWIDTH] IN BLOOD: 14.1 % (ref 11.5–15.5)
GLUCOSE SERPL-MCNC: 103 MG/DL (ref 74–99)
HCT VFR BLD AUTO: 40.4 % (ref 39–53)
HDW: 2.25
HGB BLD-MCNC: 12.9 GM/DL (ref 13–17.5)
INR PPP: 1.3 (ref ?–1.2)
IRON SERPL-MCNC: 110 UG/DL (ref 65–175)
LUC NFR BLD AUTO: 2 %
LYMPHOCYTES # SPEC AUTO: 1 K/UL (ref 1–4.8)
LYMPHOCYTES NFR SPEC AUTO: 17 %
MCH RBC QN AUTO: 34.4 PG (ref 25–35)
MCHC RBC AUTO-ENTMCNC: 31.9 G/DL (ref 31–37)
MCV RBC AUTO: 107.6 FL (ref 80–100)
MONOCYTES # BLD AUTO: 0.3 K/UL (ref 0–1)
MONOCYTES NFR BLD AUTO: 4 %
NEUTROPHILS # BLD AUTO: 4.3 K/UL (ref 1.3–7.7)
NEUTROPHILS NFR BLD AUTO: 73 %
NON-AFRICAN AMERICAN GFR(MDRD): >60
POTASSIUM SERPL-SCNC: 4.2 MMOL/L (ref 3.5–5.1)
PROT SERPL-MCNC: 7.5 G/DL (ref 6.3–8.2)
PT BLD: 12.6 SEC (ref 9–12)
SODIUM SERPL-SCNC: 138 MMOL/L (ref 137–145)
TIBC SERPL-MCNC: 238 UG/DL (ref 228–460)
TIS TRANSGLUTAMINASE IGA UNIT: <0.5 AI
WBC # BLD AUTO: 0.12 10*3/UL
WBC # BLD AUTO: 5.9 K/UL (ref 3.8–10.6)
WBC (PEROX): 5.88

## 2017-10-19 PROCEDURE — 85610 PROTHROMBIN TIME: CPT

## 2017-10-19 PROCEDURE — 83550 IRON BINDING TEST: CPT

## 2017-10-19 PROCEDURE — 82728 ASSAY OF FERRITIN: CPT

## 2017-10-19 PROCEDURE — 86038 ANTINUCLEAR ANTIBODIES: CPT

## 2017-10-19 PROCEDURE — 87340 HEPATITIS B SURFACE AG IA: CPT

## 2017-10-19 PROCEDURE — 83540 ASSAY OF IRON: CPT

## 2017-10-19 PROCEDURE — 85025 COMPLETE CBC W/AUTO DIFF WBC: CPT

## 2017-10-19 PROCEDURE — 82105 ALPHA-FETOPROTEIN SERUM: CPT

## 2017-10-19 PROCEDURE — 80053 COMPREHEN METABOLIC PANEL: CPT

## 2017-10-19 PROCEDURE — 36415 COLL VENOUS BLD VENIPUNCTURE: CPT

## 2017-10-19 PROCEDURE — 84165 PROTEIN E-PHORESIS SERUM: CPT

## 2017-10-19 PROCEDURE — 83516 IMMUNOASSAY NONANTIBODY: CPT

## 2018-05-22 ENCOUNTER — HOSPITAL ENCOUNTER (OUTPATIENT)
Dept: HOSPITAL 47 - LABWHC1 | Age: 73
Discharge: HOME | End: 2018-05-22
Payer: MEDICARE

## 2018-05-22 DIAGNOSIS — M62.81: ICD-10-CM

## 2018-05-22 DIAGNOSIS — D64.9: Primary | ICD-10-CM

## 2018-05-22 DIAGNOSIS — G62.9: ICD-10-CM

## 2018-05-22 LAB
ANION GAP SERPL CALC-SCNC: 12 MMOL/L
BUN SERPL-SCNC: 19 MG/DL (ref 9–20)
CALCIUM SPEC-MCNC: 9.1 MG/DL (ref 8.4–10.2)
CHLORIDE SERPL-SCNC: 108 MMOL/L (ref 98–107)
CO2 SERPL-SCNC: 24 MMOL/L (ref 22–30)
ERYTHROCYTE [DISTWIDTH] IN BLOOD BY AUTOMATED COUNT: 3.14 M/UL (ref 4.3–5.9)
ERYTHROCYTE [DISTWIDTH] IN BLOOD: 16.3 % (ref 11.5–15.5)
GLUCOSE SERPL-MCNC: 125 MG/DL (ref 74–99)
HBA1C MFR BLD: 4.3 % (ref 4–6)
HCT VFR BLD AUTO: 32.3 % (ref 39–53)
HGB BLD-MCNC: 10.3 GM/DL (ref 13–17.5)
MCH RBC QN AUTO: 32.8 PG (ref 25–35)
MCHC RBC AUTO-ENTMCNC: 31.8 G/DL (ref 31–37)
MCV RBC AUTO: 103.1 FL (ref 80–100)
PLATELET # BLD AUTO: 248 K/UL (ref 150–450)
POTASSIUM SERPL-SCNC: 4.1 MMOL/L (ref 3.5–5.1)
SODIUM SERPL-SCNC: 144 MMOL/L (ref 137–145)
T4 FREE SERPL-MCNC: 1.58 NG/DL (ref 0.78–2.19)
VIT B12 SERPL-MCNC: 931 PG/ML (ref 200–944)
WBC # BLD AUTO: 6.2 K/UL (ref 3.8–10.6)

## 2018-05-22 PROCEDURE — 80048 BASIC METABOLIC PNL TOTAL CA: CPT

## 2018-05-22 PROCEDURE — 84439 ASSAY OF FREE THYROXINE: CPT

## 2018-05-22 PROCEDURE — 83036 HEMOGLOBIN GLYCOSYLATED A1C: CPT

## 2018-05-22 PROCEDURE — 82747 ASSAY OF FOLIC ACID RBC: CPT

## 2018-05-22 PROCEDURE — 84165 PROTEIN E-PHORESIS SERUM: CPT

## 2018-05-22 PROCEDURE — 85027 COMPLETE CBC AUTOMATED: CPT

## 2018-05-22 PROCEDURE — 82607 VITAMIN B-12: CPT

## 2018-05-22 PROCEDURE — 36415 COLL VENOUS BLD VENIPUNCTURE: CPT

## 2018-05-22 PROCEDURE — 84443 ASSAY THYROID STIM HORMONE: CPT

## 2018-05-22 PROCEDURE — 85652 RBC SED RATE AUTOMATED: CPT

## 2018-05-24 LAB
ALBUMIN SERPL ELPH-MCNC: 3.34 G/DL (ref 3.8–4.9)
GAMMA GLOB SERPL ELPH-MCNC: 2.13 G/DL (ref 0.7–1.5)

## 2018-06-23 ENCOUNTER — HOSPITAL ENCOUNTER (OUTPATIENT)
Dept: HOSPITAL 47 - RADMRIMAIN | Age: 73
Discharge: HOME | End: 2018-06-23
Payer: MEDICARE

## 2018-06-23 DIAGNOSIS — M50.30: ICD-10-CM

## 2018-06-23 DIAGNOSIS — R90.82: ICD-10-CM

## 2018-06-23 DIAGNOSIS — Z98.1: ICD-10-CM

## 2018-06-23 DIAGNOSIS — G31.9: ICD-10-CM

## 2018-06-23 DIAGNOSIS — M99.71: ICD-10-CM

## 2018-06-23 DIAGNOSIS — M48.02: Primary | ICD-10-CM

## 2018-06-23 DIAGNOSIS — M25.78: ICD-10-CM

## 2018-06-23 PROCEDURE — 70551 MRI BRAIN STEM W/O DYE: CPT

## 2018-06-23 PROCEDURE — 72156 MRI NECK SPINE W/O & W/DYE: CPT

## 2018-06-24 NOTE — MR
EXAMINATION TYPE: MR brain wo cspine wo/w

 

DATE OF EXAM: 6/24/2018

 

COMPARISON: NONE

 

HISTORY: 73-year-old male Unsteadiness on feet/Muscle weakness

 

TECHNIQUE: Multiplanar, multisequence images of the brain and brainstem is performed without IV contr
ast. Diffusion-weighted imaging is performed.

 

Subsequent multiplanar, multisequence images of the cervical spine without and with IV contrast, util
izing 7.5 mL intravenous Gadavist .

 

FINDINGS: 

 

BRAIN:

Diffusion weighted images demonstrate no evidence of a recent infarct or other diffusion abnormality.
  

 

There is no extra-axial fluid collection.

 

T2/FLAIR weighted sequences show mild scattered burden of T2 bright white matter change located in th
e subcortical and periventricular region., Approximately 10 and the left cerebral hemisphere and less
 than 5 in the right cerebral hemisphere.

 

Major intracranial flow voids are intact.

 

No hydrocephalus or effacement of basal subarachnoid cisterns. There is mild to moderate generalized 
supratentorial volume loss.

 

Midline structures demonstrate normal morphology.  The craniocervical junction appears within normal 
limits.  

 

Mucosal thickening ethmoid air cells and trace mucosal thickening in the maxillary sinuses. Leftward 
nasal septal lesion. Globes are intact. There is partial opacification within the right mastoid air c
ells.

 

 

CERVICAL SPINE:

No craniocervical junction abnormality, predental space widening, or prevertebral soft tissue swellin
g.

 

There are postsurgical changes of C5-C7 ACDF.

 

Multilevel degenerative disc disease with degenerated, desiccated, narrowed disc disc osteophyte comp
lexes. Additional ligamentum flavum thickening at multiple levels and facet/uncovertebral joint arthr
opathy.

 

Alignment is maintained.

 

No suspicious bone marrow replacement. There is some fatty Modic type II endplate change at C3-C4.

 

At C2-C3, there is disc osteophyte complex with facet arthropathy. This causes very mild narrowing of
 the spinal canal without neural foraminal stenosis.

 

At C3-C4, there is broad-based disc osteophyte complex with uncovertebral joint and facet degenerativ
e change. Changes of both moderate left and mild-to-moderate right neuroforaminal stenosis with mild 
spinal canal stenosis.

 

At C4-C5, there is broad-based disc osteophyte complex with uncovertebral joint and facet degenerativ
e change. Additional ligamentum flavum thickening. There is mild spinal canal stenosis with abutment 
of both the dorsal and ventral cord but no cami cord information. Changes resulting in severe right 
and moderate to severe left neuroforaminal stenosis.

 

At the fused C5-C6 level, there is posterior osteophytic ridging with uncovertebral joint and facet d
egenerative changes as well as ligamentum flavum thickening. This contributes to mild spinal canal st
enosis and severe left and moderate to severe right neuroforaminal stenosis.

 

Of note, there is some volume loss of the cord with increased T2 signal in the left hemicord extendin
g down opposite the C6 level.

 

At the fused C6-C7 level, there is posterior osteophytic ridging and uncovertebral joint arthropathy.
 This extends to but and displaced the ventral cord. There is moderate to severe left and moderate ri
ght neuroforaminal stenosis at this level. Mild overall spinal canal stenosis.

 

At C7-T1, there is facet and uncovertebral joint degenerative change with mild left neuroforaminal st
enosis. Minimal posterior disc bulge without significant spinal canal stenosis.

 

No abnormal enhancement within the spinal canal.

 

No prevertebral or paravertebral soft tissue abnormality.

 

 

 

 

COMBINED IMPRESSION:

 

BRAIN:

1. No acute intracranial abnormality seen.

2. Mild to moderate atrophy. There is mild scattered burden of T2 bright white matter change, nonspec
ific, but likely relating to changes of chronic small vessel ischemic disease.

3. Mild chronic ethmoid sinus disease.

4. Some trapped fluid in the right mastoid air cells. Correlate for a mastoid pain to exclude mastoid
itis.

 

 

 

CERVICAL SPINE:

1. Status post C5-C7 ACDF. Residual posterior osteophytic ridging at the fused levels in combination 
with ligamentum flavum thickening results in mild spinal canal stenoses.

2. In particular, at C5-C6 and the C6 levels, there is volume loss of the cord with increased signal 
within the left hemicord suggestive of chronic compressive myelomalacia. Correlate with any available
 preoperative cervical spine MRI to assess stability of this finding.

3. Moderate degenerative disc disease throughout the remainder of the cervical spine also with mild s
ayesha canal stenosis nearly at all levels. At C4-C5, there is abutment of both the dorsal and ventral
 cord but without cami cord compression.

4. Variable moderate to severe neuroforaminal stenoses as outlined above including at some of the fus
ed levels.

## 2018-10-25 ENCOUNTER — HOSPITAL ENCOUNTER (OUTPATIENT)
Dept: HOSPITAL 47 - LABWHC1 | Age: 73
Discharge: HOME | End: 2018-10-25
Attending: INTERNAL MEDICINE
Payer: MEDICARE

## 2018-10-25 DIAGNOSIS — B44.81: Primary | ICD-10-CM

## 2018-10-25 PROCEDURE — 86001 ALLERGEN SPECIFIC IGG: CPT

## 2018-10-25 PROCEDURE — 36415 COLL VENOUS BLD VENIPUNCTURE: CPT

## 2018-10-25 PROCEDURE — 86606 ASPERGILLUS ANTIBODY: CPT

## 2018-10-25 PROCEDURE — 82785 ASSAY OF IGE: CPT

## 2018-10-25 PROCEDURE — 86003 ALLG SPEC IGE CRUDE XTRC EA: CPT

## 2018-10-25 PROCEDURE — 86609 BACTERIUM ANTIBODY: CPT

## 2018-10-26 LAB
A ALTERNATA IGE QN: <0.35 KU/L (ref ?–0.35)
A ALTERNATA IGE RAST: (no result)
A FUMIGATUS IGE QN: <0.35 KU/L (ref ?–0.35)
A FUMIGATUS IGE RAST: (no result)
C HERBARUM IGE QN: <0.35 KU/L (ref ?–0.35)
CAT DANDER IGE QN: (no result)
CAT DANDER IGE QN: <0.35 KU/L (ref ?–0.35)
D FARINAE IGE QN: (no result)
D FARINAE IGE QN: <0.35 KU/L (ref ?–0.35)
D PTERONYSS IGE QN: <0.35 KU/L (ref ?–0.35)
MAPLE IGE RAST: (no result)
MARSH ELDER IGE QN: (no result)
MARSH ELDER IGE QN: <0.35 KU/L (ref ?–0.35)
MOUSE URINE PROT IGE RAST: (no result)
MT JUNIPER IGE QN: (no result)
NETTLE IGE RAST: (no result)
P NOTATUM IGE QN: (no result)
SILVER BIRCH IGE QN: <0.35 KU/L (ref ?–0.35)
SILVER BIRCH IGE RAST: (no result)
WHITE ASH IGE RAST: (no result)

## 2018-11-03 ENCOUNTER — HOSPITAL ENCOUNTER (OUTPATIENT)
Dept: HOSPITAL 47 - RADCTMAIN | Age: 73
Discharge: HOME | End: 2018-11-03
Attending: INTERNAL MEDICINE
Payer: MEDICARE

## 2018-11-03 DIAGNOSIS — I25.10: ICD-10-CM

## 2018-11-03 DIAGNOSIS — Z98.890: ICD-10-CM

## 2018-11-03 DIAGNOSIS — Q27.9: ICD-10-CM

## 2018-11-03 DIAGNOSIS — J90: Primary | ICD-10-CM

## 2018-11-03 PROCEDURE — 71250 CT THORAX DX C-: CPT

## 2018-11-03 NOTE — CT
EXAMINATION TYPE: CT chest wo con

 

DATE OF EXAM: 11/3/2018

 

COMPARISON: Multiple prior CT chest

 

HISTORY: Patient complains of difficulty breathing.

 

CT DLP: 288 mGycm.  Automated Exposure Control for Dose Reduction was Utilized.

 

TECHNIQUE:  CT scan of the thorax is performed without IV contrast.

 

FINDINGS:

 

LUNGS: There are bilateral pleural effusions right greater than left. No evident lung mass.

 

MEDIASTINUM: Lack of IV contrast is noted to limit evaluation for mediastinal and especially hilar ad
enopathy. There are no definitive greater than 1 cm hilar or mediastinal lymph nodes. The heart is en
larged. There are coronary artery calcifications. Patient is post median sternotomy.

 

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 Rogers
spect gynecomastia. There is some mild ascites. Questionable thickening of the colonic wall is noted.
 There are changes of anasarca. Liver appears small and nodular. There is elevation of the right renay
diaphragm. Mesenteric fat shows increased attenuation possibly due to inflammatory change. Anterior f
lowing osteophytes along the visualized spine with preservation of disc spaces compatible with diffus
e idiopathic skeletal hyperostosis. Postop changes noted to the cervical spine.

 

IMPRESSION: Noncontrast exam. Extensive coronary artery disease. Postop changes. Small pleural effusi
ons right greater than left, correlate for possible cirrhosis, colitis, there is ascites. Congenital 
venous anomaly again noted.

## 2021-04-09 NOTE — XR
EXAM:

  XR Chest, 2 Views

 

CLINICAL HISTORY:

chest and jaw pain, rash, hx: heart attack  

 

TECHNIQUE:

  Frontal and lateral views of the chest.

 

COMPARISON:

  10/16/16

 

FINDINGS:

  Lungs:  Unremarkable.  No consolidation.

  Pleural space:  Unremarkable.  No pneumothorax.

  Heart:  Unremarkable.  No cardiomegaly.

  Mediastinum:  Unremarkable.

  Bones/joints:  Sternal wires are again noted.  anterior fusion hardware 

at of the lower cervical spine are noted.  Osteopenia.  Moderate 

degenerative changes.

 

IMPRESSION:     

  No acute findings
Alert-The patient is alert, awake and responds to voice. The patient is oriented to time, place, and person. The triage nurse is able to obtain subjective information.
